# Patient Record
Sex: FEMALE | Race: WHITE
[De-identification: names, ages, dates, MRNs, and addresses within clinical notes are randomized per-mention and may not be internally consistent; named-entity substitution may affect disease eponyms.]

---

## 2020-01-08 ENCOUNTER — HOSPITAL ENCOUNTER (EMERGENCY)
Dept: HOSPITAL 59 - ER | Age: 30
Discharge: HOME | End: 2020-01-08
Payer: COMMERCIAL

## 2020-01-08 DIAGNOSIS — O26.892: Primary | ICD-10-CM

## 2020-01-08 DIAGNOSIS — Z3A.19: ICD-10-CM

## 2020-01-08 DIAGNOSIS — F17.210: ICD-10-CM

## 2020-01-08 DIAGNOSIS — R10.32: ICD-10-CM

## 2020-01-08 DIAGNOSIS — O99.332: ICD-10-CM

## 2020-01-08 LAB
ABSOLUTE NEUTROPHIL COUNT: 13.75
ANION GAP SERPL CALC-SCNC: 15 MMOL/L (ref 7–16)
APPEARANCE UR: CLEAR
B-HCG SERPL-ACNC: (no result) MIU/ML
BASOPHILS NFR BLD: 0.1 % (ref 0–6)
BASOPHILS NFR BLD: 1 % (ref 0–6)
BILIRUB UR-MCNC: NEGATIVE MG/DL
BUN SERPL-MCNC: 6 MG/DL (ref 6–20)
CO2 SERPL-SCNC: 22 MMOL/L (ref 22–29)
COLOR UR: YELLOW
CREAT SERPL-MCNC: 0.6 MG/DL (ref 0.5–0.9)
EOSINOPHIL NFR BLD: 0 % (ref 0–6)
EOSINOPHIL NFR BLD: 0.1 % (ref 0–6)
ERYTHROCYTE [DISTWIDTH] IN BLOOD BY AUTOMATED COUNT: 13.4 % (ref 11.5–14.5)
EST GLOMERULAR FILTRATION RATE: > 60 ML/MIN
GLUCOSE SERPL-MCNC: 87 MG/DL (ref 74–109)
GLUCOSE UR STRIP-MCNC: NEGATIVE MG/DL
HCT VFR BLD CALC: 40.8 % (ref 35–47)
HGB BLD-MCNC: 13.5 GM/DL (ref 11.6–16)
KETONES UR QL STRIP: (no result)
LYMPHOCYTES NFR BLD AUTO: 9.6 % (ref 16–45)
LYMPHOCYTES NFR BLD: 15 % (ref 16–45)
MCH RBC QN AUTO: 30.5 PG (ref 27–33)
MCHC RBC AUTO-ENTMCNC: 33.1 G/DL (ref 32–36)
MCV RBC AUTO: 92.3 FL (ref 81–97)
MONOCYTES NFR BLD: 4 % (ref 0–9)
MONOCYTES NFR BLD: 4.7 % (ref 0–9)
NEUTROPHILS NFR BLD AUTO: 80 % (ref 47–80)
NEUTS BAND NFR BLD: 0 % (ref 0–5)
NITRITE UR QL STRIP: NEGATIVE
PLATELET # BLD: 297 K/UL (ref 130–400)
PMV BLD AUTO: 11 FL (ref 7.4–10.4)
PROT UR QL STRIP: NEGATIVE
RBC # BLD AUTO: 4.42 M/UL (ref 3.8–5.4)
RBC # UR STRIP: NEGATIVE /UL
URINE LEUKOCYTE ESTERASE: NEGATIVE
UROBILINOGEN UR STRIP-ACNC: 0.2 E.U./DL (ref 0.2–1)
WBC # BLD AUTO: 16.1 K/UL (ref 4.2–12.2)

## 2020-01-08 PROCEDURE — 99284 EMERGENCY DEPT VISIT MOD MDM: CPT

## 2020-01-08 PROCEDURE — 85027 COMPLETE CBC AUTOMATED: CPT

## 2020-01-08 PROCEDURE — 84702 CHORIONIC GONADOTROPIN TEST: CPT

## 2020-01-08 PROCEDURE — 86901 BLOOD TYPING SEROLOGIC RH(D): CPT

## 2020-01-08 PROCEDURE — 81003 URINALYSIS AUTO W/O SCOPE: CPT

## 2020-01-08 PROCEDURE — 80048 BASIC METABOLIC PNL TOTAL CA: CPT

## 2020-01-08 PROCEDURE — 76815 OB US LIMITED FETUS(S): CPT

## 2020-01-08 NOTE — ULTRASOUND REPORT
EXAMINATION: Limited Single Gestation Obstetrical Ultrasound, Transabdominal, Greater than 14 Weeks 

Exam Date:  1/8/2020 4:12 PM



TECHNIQUE:  Limited transabdominal obstetrical ultrasound imaging of the pregnant uterus and maternal
 adnexa was performed.



INDICATION: abd pain and 18 weeks preg

COMPARISON EXAM: None.

___________________



FETAL EVALUATION:



Pregnancy Location: Intrauterine.

Fetal Heart Rate: 148 bpm

Fetal Heart Rhythm: Normal.

Fetal Presentation: Cephalic.

Placenta: Anterior.

Placenta cord insertion: Not visualized.

Gestation: Del Cid

__________________



Amniotic fluid volume: Normal.

FELICIANO: 11 cm

Single deepest pocket: 3.2 cm.



Other Findings:  None.

__________________



Biometry



BPD: 4.4 cm.   19 weeks 0 days. %ile

HC:  16.2 cm.   18 weeks 6 days. %ile

AC:  12.7 cm  .  18 weeks 1 days. %ile

FL: 2.7 cm.  18 weeks 0 days. %ile



Cephalic index:  77.6 %   (70-86)

HC/AC:  1.27                       (1.06-1.25)

FL/BPD:  62.5 %              (71-87)



EFW:   237 grams.  34.6 %ile

__________________



Fetal Dating:



Gestational age by current ultrasound:  18 weeks, 4 days.     SHELLEY: 6/6/2020



Best Gestational age from LMP:  18 weeks, 4 days.     SHELLEY: 6/6/2020

___________________



Fetal Anatomy: Not evaluated.

___________________



Cervical length, transabdominal measurement: 4 cm. Normal length.   

Uterus:  No abnormality in the visualized uterus.

Right ovary:  Not seen. The ovary is obscured by bowel gas.

Left ovary:  Not seen. The ovary is obscured by bowel gas.

Ovary Doppler Imaging:  Not ordered.



Additional findings:



Biophysical profile:  Not ordered.



The umbilical cord S/D is:  Not ordered.

___________________



IMPRESSION: 



Single intrauterine pregnancy with a gestational age of 18 weeks, 4 days.     SHELLEY: 6/6/2020





Dictated by: David Cordova MD on 1/8/2020 4:59 PM.

Electronically signed by: David Cordova MD on 1/8/2020 5:07 PM.

## 2020-01-08 NOTE — EMERGENCY DEPARTMENT RECORD
History of Present Illness





- General


Chief complaint: Pregnancy complication


Stated complaint: PREGNANT,PAIN


Time Seen by Provider: 20 15:17


Source: Patient


Mode of Arrival: Ambulatory


Travel/Exposure to [place] Within 21 Days of Symptoms: No





- History of Present Illness


Initial comments: 


abdominal cramping lower bilateral mostly on the left side and no vaginal 

bleeding and no dysurea, slight cough,No vomiting , no diarrhea, She is preg 18 

weeks , No sex recently 





Onset/Timin


-: Days(s)


Location: Pelvis


Severity scale (1-10): 6


Quality: Sharp, Stabbing


Consistency: Intermittent


Improves with: None


Worsens with: None


Associated symptoms: Denies other symptoms


Vaginal bleeding: None


Pre- care: Followed by OB, Previous ultrasound confirms IUP





- Related Data


                                Home Medications











 Medication  Instructions  Recorded  Confirmed  Last Taken


 


Pnv No.95/Ferrous Fum/Folic AC 1 tab PO DAILY 20





[Prenatal Vitamin Tablet]    











                                    Allergies











Allergy/AdvReac Type Severity Reaction Status Date / Time


 


acetaminophen [From Vicodin] Allergy  ITCHING Verified 20 15:03


 


cefprozil [From Cefzil] Allergy  RASH Verified 20 15:03


 


hydrocodone bitartrate Allergy  ITCHING Verified 20 15:03





[From Vicodin]     














Review of Systems


Reviewed: No additional complaints except as noted below


Constitutional: Reports: As per HPI.  Denies: Chills, Fever, Malaise, Night 

sweats, Weakness, Weight change


Eyes: Reports: As per HPI.  Denies: Eye discharge, Eye pain, Photophobia, Vision

change


ENT: Reports: As per HPI.  Denies: Congestion, Dental pain, Ear pain, Epistaxis,

Hearing loss, Throat pain


Respiratory: Reports: As per HPI.  Denies: Cough, Dyspnea, Hemoptysis, Stridor, 

Wheezes


Cardiovascular: Reports: As per HPI.  Denies: Arrhythmia, Chest pain, Dyspnea on

exertion, Edema, Murmurs, Orthopnea, Palpitations, Paroxysmal nocturnal dyspnea,

Rheumatic Fever, Syncope


Endocrine: Reports: As per HPI.  Denies: Fatigue, Heat or cold intolerance, 

Polydipsia, Polyuria


Gastrointestinal: Reports: As per HPI, Abdominal pain.  Denies: Constipation, 

Diarrhea, Hematemesis, Hematochezia, Melena, Nausea, Vomiting


Genitourinary: Reports: As per HPI.  Denies: Abnormal menses, Discharge, 

Dyspareunia, Dysuria, Frequency, Hematuria, Incontinence, Retention, Urgency


Musculoskeletal: Reports: As per HPI.  Denies: Arthralgia, Back pain, Gout, 

Joint swelling, Myalgia, Neck pain


Skin: Reports: As per HPI.  Denies: Bruising, Change in color, Change in 

hair/nails, Lesions, Pruritus, Rash


Neurological: Reports: As per HPI.  Denies: Abnormal gait, Confusion, Headache, 

Numbness, Paresthesias, Seizure, Tingling, Tremors, Vertigo, Weakness


Psychiatric: Reports: As per HPI.  Denies: Anxiety, Auditory hallucinations, 

Depression, Homicidal thoughts, Suicidal thoughts, Visual hallucinations


Hematological/Lymphatic: Reports: As per HPI.  Denies: Anemia, Blood Clots, Easy

bleeding, Easy bruising, Swollen glands





Past Medical History





- SOCIAL HISTORY


Smoking Status: Current every day smoker


Alcohol Use: None


Drug Use: None





- RESPIRATORY


Hx Respiratory Disorders: Yes


Hx Asthma: Yes (Sports induced)





- CARDIOVASCULAR


Hx Cardio Disorders: No





- NEURO


Hx Neuro Disorders: Yes


Hx Headaches: Yes





- GI


Hx GI Disorders: Yes


Hx Reflux: Yes





- 


Hx Genitourinary Disorders: No





- ENDOCRINE


Hx Endocrine Disorders: No





- MUSCULOSKELETAL


Hx Musculoskeletal Disorders: No





- PSYCH


Hx Psych Problems: No





- HEMATOLOGY/ONCOLOGY


Hx Hematology/Oncology Disorders: No





Family Medical History


Any Significant Family History?: No





Physical Exam





- General


General Appearance: Alert, Oriented x3, Cooperative, No acute distress





- Head


Head exam: Normal inspection





- Eye


Eye exam: Normal appearance, PERRL


Pupils: Normal accommodation





- ENT


ENT exam: Normal exam, Mucous membranes moist, Normal external ear exam, Normal 

orophraynx, TM's normal bilaterally


Ear exam: Normal external inspection.  negative: External canal tenderness


Nasal Exam: Normal inspection.  negative: Discharge, Sinus tenderness


Mouth exam: Normal external inspection, Tongue normal


Teeth exam: Normal inspection.  negative: Dental caries


Throat exam: Normal inspection.  negative: Tonsillar erythema, Tonsillar exudate





- Neck


Neck exam: Normal inspection, Full ROM.  negative: Tenderness





- Respiratory


Respiratory exam: Normal lung sounds bilaterally.  negative: Respiratory 

distress





- Cardiovascular


Cardiovascular Exam: Regular rate, Normal rhythm, Normal heart sounds





- GI/Abdominal


GI/Abdominal exam: Soft, Normal bowel sounds, Tenderness (lower abd pain), Other

()





- Rectal


Rectal exam: Deferred





- 


 exam: Deferred





- Extremities


Extremities exam: Normal inspection, Full ROM, Normal capillary refill.  

negative: Tenderness





- Back


Back exam: Reports: Normal inspection, Full ROM.  Denies: Muscle spasm, Rash 

noted, Tenderness





- Neurological


Neurological exam: Alert, Normal gait, Oriented X3, Reflexes normal





- Psychiatric


Psychiatric exam: Normal affect, Normal mood





- Skin


Skin exam: Dry, Intact, Normal color, Warm





Course





                                   Vital Signs











  20





  15:04


 


Temperature 98.6 F


 


Pulse Rate 94 H


 


Respiratory 20





Rate 


 


Blood Pressure 128/8


 


Pulse Ox 99














- Reevaluation(s)


Reevaluation #1: 


Patient is feeling better


20 17:25








Medical Decision Making





- Data Complexity


MDM Data: Labs Ordered and/or Reviewed (RH positive, WBC 16,100, hg 13.5), X-Ray

Ordered and/or Reviewed (US fetal heart activity present, normal US)





- Lab Data


Result diagrams: 


                                 20 15:28





                                 20 15:28





                                   Lab Results











  20 Range/Units





  15:28 15:28 


 


WBC  16.1 H   (4.2-12.2)  K/uL


 


RBC  4.42   (3.80-5.40)  M/uL


 


Hgb  13.5   (11.6-16.0)  gm/dl


 


Hct  40.8   (35.0-47.0)  %


 


MCV  92.3   (81-97)  fl


 


MCH  30.5   (27-33)  pg


 


MCHC  33.1   (32-36)  g/dl


 


RDW  13.4   (11.5-14.5)  %


 


Plt Count  297   (130-400)  K/uL


 


MPV  11.0 H   (7.4-10.4)  fl


 


Lymphocytes %  9.6 L   (16-45)  %


 


Monocytes %  4.7   (0-9)  %


 


Eosinophils %  0.1   (0-6)  %


 


Basophils %  0.1   (0-6)  %


 


Absolute Neutrophils  13.75   


 


Urine Color   Yellow  


 


Urine Appearance   Clear  


 


Urine pH   6.0  (5.0-8.0)  


 


Ur Specific Gravity   >= 1.030  (1.002-1.030)  


 


Urine Protein   Negative  (NEGATIVE)  


 


Urine Glucose (UA)   Negative  (NEGATIVE)  


 


Urine Ketones   40 mg/dl H  (NEGATIVE)  


 


Urine Blood   Negative  (NEGATIVE)  


 


Urine Nitrite   Negative  (NEGATIVE)  


 


Urine Bilirubin   Negative  (NEGATIVE)  


 


Urine Urobilinogen   0.2  (0.20 - 1.00)  E.U./dL


 


Ur Leukocyte Esterase   Negative  (NEGATIVE)  














Disposition


Clinical Impression: 


Abdominal pain


Qualifiers:


 Abdominal location: lower abdomen, unspecified Qualified Code(s): R10.30 - 

Lower abdominal pain, unspecified





Pregnancy


Qualifiers:


 Weeks of gestation: 19 weeks Qualified Code(s): Z3A.19 - 19 weeks gestation of 

pregnancy





Disposition: Home, Self-Care


Condition: (2) Stable


Instructions:  Pregnancy (ED), Abdominal Pain (ED)


Additional Instructions: 


follow up with OB Dr tomorrow or call their office.


heat three times aday


tylenol for pain three times a day


Forms:  Patient Portal Access


Time of Disposition: 17:10





Quality





- Quality Measures


Quality Measures: N/A





- Blood Pressure Screening


Does Patient Have Any of the Following: No


Blood Pressure Classification: Pre-Hypertensive BP Reading


Systolic Measurement: 128


Diastolic Measurement: 8


Screening for High Blood Pressure: < Pre-Hypertensive BP, F/U Documented > 

[]


Pre-Hypertensive Follow-up Interventions: Referral to alternative/primary care 

provider.